# Patient Record
Sex: FEMALE
[De-identification: names, ages, dates, MRNs, and addresses within clinical notes are randomized per-mention and may not be internally consistent; named-entity substitution may affect disease eponyms.]

---

## 2023-03-04 ENCOUNTER — NURSE TRIAGE (OUTPATIENT)
Dept: OTHER | Facility: CLINIC | Age: 53
End: 2023-03-04

## 2023-03-04 NOTE — TELEPHONE ENCOUNTER
Location of patient: [facility]    Received call from Kevin at [facility] with Red Flag Complaint. Oscar Beaver MRN: [id number]    Provider: Aaron ASKEW    Subjective: Caller states \"I am having a skin problem. I think it could be ringworm. It was on cheek and on my nose. I put some olive oil on it. It has turned red and circular. On Cheek and down sides of nose, around mouth and chin. Started 4-5 days ago. \"     Current Symptoms: Rash on cheeks, nose and around mouth and chin. Nausea, flaking off skin patches    Associated Symptoms: NA    Pain Severity: 2/10; itchy and irritated; intermittent    Temperature: patient denies by unknown method    What has been tried: Dabbed with vinegar     Recommended disposition: See PCP within 3 days    Care advice provided, patient verbalizes understanding; denies any other questions or concerns.     Outcome: No appointments available, advised patient to go to Walk in clinic or [facility]    No Appointments available, patient referred to [address] [place] [address]      This triage is a result of a call to the [facility]      Reason for Disposition   Ringworm suspected (i.e., round pink patch, sometimes looks like ring, usually 1/2 to 1 inch [12-25 mm],  in size, slowly increasing in size)   4 or more spots are present    Protocols used: Ringworm-ADULT-AH, Rash or Redness - Localized-ADULT-AH